# Patient Record
Sex: FEMALE | Race: WHITE | Employment: FULL TIME | URBAN - METROPOLITAN AREA
[De-identification: names, ages, dates, MRNs, and addresses within clinical notes are randomized per-mention and may not be internally consistent; named-entity substitution may affect disease eponyms.]

---

## 2019-09-12 ENCOUNTER — HOSPITAL ENCOUNTER (OUTPATIENT)
Dept: NEUROLOGY | Age: 29
Discharge: HOME | End: 2019-09-12

## 2019-09-12 PROCEDURE — 95861 NEEDLE EMG 2 EXTREMITIES: CPT

## 2022-11-14 ENCOUNTER — HOSPITAL ENCOUNTER (EMERGENCY)
Age: 32
Discharge: HOME | End: 2022-11-14
Attending: EMERGENCY MEDICINE
Payer: COMMERCIAL

## 2022-11-14 ENCOUNTER — APPOINTMENT (OUTPATIENT)
Dept: GENERAL RADIOLOGY | Age: 32
End: 2022-11-14
Payer: COMMERCIAL

## 2022-11-14 VITALS
HEIGHT: 62 IN | BODY MASS INDEX: 40.48 KG/M2 | OXYGEN SATURATION: 100 % | TEMPERATURE: 97.3 F | DIASTOLIC BLOOD PRESSURE: 78 MMHG | RESPIRATION RATE: 16 BRPM | HEART RATE: 72 BPM | SYSTOLIC BLOOD PRESSURE: 142 MMHG | WEIGHT: 220 LBS

## 2022-11-14 DIAGNOSIS — R07.9 CHEST PAIN, UNSPECIFIED TYPE: Primary | ICD-10-CM

## 2022-11-14 LAB
ALBUMIN SERPL-MCNC: 4.4 GM/DL (ref 3.4–5)
ALP BLD-CCNC: 67 IU/L (ref 40–129)
ALT SERPL-CCNC: 19 U/L (ref 10–40)
ANION GAP SERPL CALCULATED.3IONS-SCNC: 9 MMOL/L (ref 4–16)
AST SERPL-CCNC: 20 IU/L (ref 15–37)
BASOPHILS ABSOLUTE: 0.1 K/CU MM
BASOPHILS RELATIVE PERCENT: 1 % (ref 0–1)
BILIRUB SERPL-MCNC: 0.2 MG/DL (ref 0–1)
BUN BLDV-MCNC: 14 MG/DL (ref 6–23)
CALCIUM SERPL-MCNC: 9.9 MG/DL (ref 8.3–10.6)
CHLORIDE BLD-SCNC: 102 MMOL/L (ref 99–110)
CO2: 26 MMOL/L (ref 21–32)
CREAT SERPL-MCNC: 0.7 MG/DL (ref 0.6–1.1)
D DIMER: <0.47 UG/ML (FEU)
DIFFERENTIAL TYPE: ABNORMAL
EKG ATRIAL RATE: 120 BPM
EKG DIAGNOSIS: NORMAL
EKG P AXIS: 63 DEGREES
EKG P-R INTERVAL: 162 MS
EKG Q-T INTERVAL: 322 MS
EKG QRS DURATION: 80 MS
EKG QTC CALCULATION (BAZETT): 455 MS
EKG R AXIS: 45 DEGREES
EKG T AXIS: 8 DEGREES
EKG VENTRICULAR RATE: 120 BPM
EOSINOPHILS ABSOLUTE: 0.1 K/CU MM
EOSINOPHILS RELATIVE PERCENT: 1.2 % (ref 0–3)
GFR SERPL CREATININE-BSD FRML MDRD: >60 ML/MIN/1.73M2
GLUCOSE BLD-MCNC: 119 MG/DL (ref 70–99)
HCG QUALITATIVE: NEGATIVE
HCT VFR BLD CALC: 37.5 % (ref 37–47)
HEMOGLOBIN: 12.9 GM/DL (ref 12.5–16)
IMMATURE NEUTROPHIL %: 0.1 % (ref 0–0.43)
LIPASE: 30 IU/L (ref 13–60)
LYMPHOCYTES ABSOLUTE: 3 K/CU MM
LYMPHOCYTES RELATIVE PERCENT: 36.7 % (ref 24–44)
MCH RBC QN AUTO: 29.4 PG (ref 27–31)
MCHC RBC AUTO-ENTMCNC: 34.4 % (ref 32–36)
MCV RBC AUTO: 85.4 FL (ref 78–100)
MONOCYTES ABSOLUTE: 0.8 K/CU MM
MONOCYTES RELATIVE PERCENT: 9.7 % (ref 0–4)
PDW BLD-RTO: 12.5 % (ref 11.7–14.9)
PLATELET # BLD: 247 K/CU MM (ref 140–440)
PMV BLD AUTO: 10.7 FL (ref 7.5–11.1)
POTASSIUM SERPL-SCNC: 3.4 MMOL/L (ref 3.5–5.1)
RBC # BLD: 4.39 M/CU MM (ref 4.2–5.4)
SEGMENTED NEUTROPHILS ABSOLUTE COUNT: 4.3 K/CU MM
SEGMENTED NEUTROPHILS RELATIVE PERCENT: 51.3 % (ref 36–66)
SODIUM BLD-SCNC: 137 MMOL/L (ref 135–145)
TOTAL IMMATURE NEUTOROPHIL: 0.01 K/CU MM
TOTAL PROTEIN: 7.3 GM/DL (ref 6.4–8.2)
TROPONIN T: <0.01 NG/ML
WBC # BLD: 8.3 K/CU MM (ref 4–10.5)

## 2022-11-14 PROCEDURE — 84484 ASSAY OF TROPONIN QUANT: CPT

## 2022-11-14 PROCEDURE — 93005 ELECTROCARDIOGRAM TRACING: CPT | Performed by: EMERGENCY MEDICINE

## 2022-11-14 PROCEDURE — 93010 ELECTROCARDIOGRAM REPORT: CPT | Performed by: INTERNAL MEDICINE

## 2022-11-14 PROCEDURE — C9113 INJ PANTOPRAZOLE SODIUM, VIA: HCPCS | Performed by: EMERGENCY MEDICINE

## 2022-11-14 PROCEDURE — 84703 CHORIONIC GONADOTROPIN ASSAY: CPT

## 2022-11-14 PROCEDURE — 85025 COMPLETE CBC W/AUTO DIFF WBC: CPT

## 2022-11-14 PROCEDURE — 83690 ASSAY OF LIPASE: CPT

## 2022-11-14 PROCEDURE — 85379 FIBRIN DEGRADATION QUANT: CPT

## 2022-11-14 PROCEDURE — 71046 X-RAY EXAM CHEST 2 VIEWS: CPT

## 2022-11-14 PROCEDURE — 6360000002 HC RX W HCPCS: Performed by: EMERGENCY MEDICINE

## 2022-11-14 PROCEDURE — 6370000000 HC RX 637 (ALT 250 FOR IP): Performed by: EMERGENCY MEDICINE

## 2022-11-14 PROCEDURE — 80053 COMPREHEN METABOLIC PANEL: CPT

## 2022-11-14 PROCEDURE — 99285 EMERGENCY DEPT VISIT HI MDM: CPT

## 2022-11-14 PROCEDURE — 96374 THER/PROPH/DIAG INJ IV PUSH: CPT

## 2022-11-14 RX ORDER — MAGNESIUM HYDROXIDE/ALUMINUM HYDROXICE/SIMETHICONE 120; 1200; 1200 MG/30ML; MG/30ML; MG/30ML
30 SUSPENSION ORAL ONCE
Status: COMPLETED | OUTPATIENT
Start: 2022-11-14 | End: 2022-11-14

## 2022-11-14 RX ORDER — OMEPRAZOLE 20 MG/1
40 CAPSULE, DELAYED RELEASE ORAL DAILY
Qty: 60 CAPSULE | Refills: 0 | Status: SHIPPED | OUTPATIENT
Start: 2022-11-14 | End: 2022-12-14

## 2022-11-14 RX ORDER — PANTOPRAZOLE SODIUM 40 MG/10ML
40 INJECTION, POWDER, LYOPHILIZED, FOR SOLUTION INTRAVENOUS ONCE
Status: COMPLETED | OUTPATIENT
Start: 2022-11-14 | End: 2022-11-14

## 2022-11-14 RX ADMIN — PANTOPRAZOLE SODIUM 40 MG: 40 INJECTION, POWDER, FOR SOLUTION INTRAVENOUS at 01:16

## 2022-11-14 RX ADMIN — ALUMINUM HYDROXIDE, MAGNESIUM HYDROXIDE, AND SIMETHICONE 30 ML: 200; 200; 20 SUSPENSION ORAL at 01:16

## 2022-11-14 ASSESSMENT — PAIN SCALES - GENERAL: PAINLEVEL_OUTOF10: 6

## 2022-11-14 ASSESSMENT — PAIN - FUNCTIONAL ASSESSMENT: PAIN_FUNCTIONAL_ASSESSMENT: 0-10

## 2022-11-14 ASSESSMENT — PAIN DESCRIPTION - LOCATION: LOCATION: CHEST

## 2022-11-14 ASSESSMENT — HEART SCORE: ECG: 1

## 2022-11-14 ASSESSMENT — PAIN DESCRIPTION - DESCRIPTORS: DESCRIPTORS: PRESSURE

## 2022-11-14 NOTE — Clinical Note
Guillermo Matos was seen and treated in our emergency department on 11/14/2022. She may return to work on 11/15/2022. If you have any questions or concerns, please don't hesitate to call.       Iline Nyhan, MD

## 2022-11-14 NOTE — ED PROVIDER NOTES
[address] [facility] ENCOUNTER      Pt Name: Bernabe Rodríguez  MRN: [id number]  Chidi 1990  Date of evaluation: 11/14/2022  Provider: Olivia Weinstein MD    CHIEF COMPLAINT       Chief Complaint   Patient presents with    Chest Pain     Reports got warm, nauseous and          HISTORY OF PRESENT ILLNESS    HPI    Nursing Notes were reviewed. This is a 28 y.o. female who presents to the emergency department with sudden onset of chest pain and palpitations. The patient says that she was sitting on her couch at home when she had a sudden onset of chest pain along with palpitations and a sensation of tachycardia. She says that her apple watch told her that her heart rate was over 140. The patient says that she does have a history of anxiety, however she says that the symptoms are different from her prior episode in regard to the chest pain. She says she has not had chest pain during her episodes of anxiety in the past and she is not sure that this represents a similar episode. Here in the emergency department, the patient still has chest pain but it is somewhat improved from previous. She is no longer tachycardic and no longer has a sensation of palpitations. She describes nausea and a sensation of warmth at the time of the palpitations, however the nausea has also improved. She otherwise denies recent fevers. She denies significant shortness of breath. She denies cough. Denies abdominal pain. She denies leg pains. She denies recent travel. The patient says that she does have some gastric upset due to her medication and frequently takes Tums for the gastric upset however she is not on any regular antacid medication. REVIEW OF SYSTEMS       Review of Systems    10 Systems were reviewed with this patient and all were negative with exception of those noted in the history of present illness above.       PAST MEDICAL HISTORY     Past Medical History:   Diagnosis Date    Post partum depression          SURGICAL HISTORY       Past Surgical History:   Procedure Laterality Date    APPENDECTOMY       SECTION  2010    TUBAL LIGATION           CURRENT MEDICATIONS       Discharge Medication List as of 2022  2:13 AM        CONTINUE these medications which have NOT CHANGED    Details   ibuprofen (CHILDRENS ADVIL) 100 MG/5ML suspension Take 20 mLs by mouth every 4 hours as needed for Fever., Disp-1 Bottle, R-3      dicyclomine (BENTYL) 10 MG capsule Take 1 capsule by mouth 4 times daily as needed for Pain (cramps) for 20 doses. , Disp-20 capsule, R-0      ibuprofen (ADVIL;MOTRIN) 600 MG tablet Take 600 mg by mouth every 6 hours as needed. sertraline (ZOLOFT) 50 MG tablet Take 100 mg by mouth daily. ALLERGIES     Betadine [povidone iodine] and Shellfish-derived products    FAMILY HISTORY       Family History   Problem Relation Age of Onset    Depression Mother           SOCIAL HISTORY       Social History     Socioeconomic History    Marital status: Single   Tobacco Use    Smoking status: Never   Vaping Use    Vaping Use: Every day    Substances: Nicotine   Substance and Sexual Activity    Alcohol use: No    Drug use: No    Sexual activity: Yes     Partners: Male       PHYSICAL EXAM       ED Triage Vitals [22 0045]   BP Temp Temp src Heart Rate Resp SpO2 Height Weight   (!) 150/89 -- -- 76 18 99 % 5' 2\" (1.575 m) 220 lb (99.8 kg)       Physical Exam  Vitals and nursing note reviewed. Constitutional:       General: She is not in acute distress. Appearance: Normal appearance. She is obese. She is not ill-appearing, toxic-appearing or diaphoretic. HENT:      Head: Normocephalic and atraumatic. Nose: Nose normal.      Mouth/Throat:      Mouth: Mucous membranes are moist.   Eyes:      Extraocular Movements: Extraocular movements intact. Pupils: Pupils are equal, round, and reactive to light.    Cardiovascular: Rate and Rhythm: Normal rate and regular rhythm. Pulses: Normal pulses. Heart sounds: Normal heart sounds. Pulmonary:      Effort: Pulmonary effort is normal.      Breath sounds: Normal breath sounds. Abdominal:      Palpations: Abdomen is soft. Tenderness: There is no abdominal tenderness. Musculoskeletal:         General: No tenderness. Normal range of motion. Cervical back: Normal range of motion. Right lower leg: No edema. Left lower leg: No edema. Skin:     General: Skin is warm and dry. Capillary Refill: Capillary refill takes less than 2 seconds. Neurological:      General: No focal deficit present. Mental Status: She is alert and oriented to person, place, and time. Psychiatric:         Mood and Affect: Mood normal.         Behavior: Behavior normal.       Vitals:    Vitals:    11/14/22 0045 11/14/22 0119 11/14/22 0219   BP: (!) 150/89  (!) 142/78   Pulse: 76  72   Resp: 18  16   Temp:  97.3 °F (36.3 °C)    TempSrc:  Infrared    SpO2: 99%  100%   Weight: 220 lb (99.8 kg)     Height: 5' 2\" (1.575 m)         DIAGNOSTIC RESULTS     EKG: All EKG's are interpreted by the Emergency Department Physician who either signs or Co-signs this chart in the absence of a cardiologist.    Sinus tachycardia. Ventricular rate of 120. SC is 162. QRS is 80. QTc is 455. There are no abnormal ST elevations or depressions. There is no ventricular ectopy. There are biphasic T waves in leads III of unclear significance. There is no previous EKG available for comparison at this time. RADIOLOGY:   Non-plain film images such as CT, Ultrasound and MRI are read by the radiologist. Plain radiographic images are visualized and preliminarily interpreted by the emergency physician with the below findings:    Interpretation per the Radiologist below, if available at the time of this note:    XR CHEST (2 VW)   Final Result   No acute process.              LABS:  Labs Reviewed   CBC WITH AUTO DIFFERENTIAL - Abnormal; Notable for the following components:       Result Value    Monocytes % 9.7 (*)     All other components within normal limits   COMPREHENSIVE METABOLIC PANEL - Abnormal; Notable for the following components:    Potassium 3.4 (*)     Glucose 119 (*)     All other components within normal limits   LIPASE   TROPONIN   HCG, SERUM, QUALITATIVE   D-DIMER, RAPID       All other labs were within normal range or not returned as of this dictation. MEDICAL DECISION MAKING     Medications   aluminum & magnesium hydroxide-simethicone (MAALOX) 200-200-20 MG/5ML suspension 30 mL (30 mLs Oral Given 11/14/22 0116)   pantoprazole (PROTONIX) injection 40 mg (40 mg IntraVENous Given 11/14/22 0116)         MDM  Patient has minimal risk factors for acute coronary syndrome and her symptoms appear to be somewhat transient. Patient has no significant risk factors for thromboembolic disease, given low risk, I feel a D-dimer is appropriate to screen for thromboembolic disease. Patient is afebrile and has no significant shortness of breath or cough and pneumonia is unlikely. The patient has a history of anxiety however this is difficult to evaluate has had in the emergency department. The patient also has a history of gastritis and this is also difficult to evaluate in the setting of the emergency department. Nonetheless, the patient has been given antacid medication with mild improvement of her symptoms. A definitive etiology of the patient's symptoms could not be elucidated here in the emergency department. Laboratory evaluation is normal.  Chest x-ray is normal.  EKG shows a mild sinus tachycardia however this is transient and the patient's heart rate is normal both on arrival here in the emergency department and after the EKG was completed.     The possibility of the patient's anxiety exacerbation remains a consideration as well as gastritis, however this cannot be clearly defined in the emergency department. The patient will be discharged home with instructions to follow-up with her primary care physician as well as a gastroenterologist.  She is directed to come back to the emergency room if she has any worsening symptoms including worsening pain, fever, difficulty breathing or any other concerning symptoms. The following Diagnoses were considered in this patient and determined to be unlikely based on the history of present illness, physical exam, as well as other information available. S-T Elevation Myocardial Infarction  Non-S-T Elevation Myocardial Infarction  Pulmonary Embolism  Pneumothorax  Pneumonia  Pericarditis  Esophageal Rupture  Rib Fracture    Primary Diagnosis:Mild acute chest pain    Clinical Diagnoses Addressed  1. Chest pain, unspecified type                [place] Coma Scale  Eye Opening: Spontaneous  Best Verbal Response: Oriented  Best Motor Response: Obeys commands  Laxmi Coma Scale Score: 15     Heart Score for chest pain patients  History: Slightly Suspicious  ECG: Non-Specifc repolarization disturbance/LBTB/PM  Patient Age: < 45 years  *Risk factors for Atherosclerotic disease: Obesity  Risk Factors: 1 or 2 risk factors  Troponin: < 1X normal limit  Heart Score Total: 2               CIWA Assessment  BP: (!) 142/78  Heart Rate: 72                          CONSULTS:  None    PROCEDURES:  Unless otherwise noted below, none     Procedures      FINAL IMPRESSION      1. Chest pain, unspecified type          DISPOSITION/PLAN   DISPOSITION Decision To Discharge 11/14/2022 02:09:28 AM      PATIENT REFERRED TO:  [id number] [facility]    Call in 1 day      MD Deshawn Richardson [address] [facility]  [phone]    Call in 1 day      DISCHARGE MEDICATIONS:  Discharge Medication List as of 11/14/2022  2:13 AM        START taking these medications    Details   omeprazole (PRILOSEC) 20 MG delayed release capsule Take 2 capsules by mouth Daily, Disp-60 capsule, R-0Normal           Controlled Substances Monitoring:     No flowsheet data found.     Nitin Cook MD (electronically signed)  Attending Emergency Physician            Nitin Cook MD  11/14/22 4545

## 2024-11-17 ENCOUNTER — APPOINTMENT (OUTPATIENT)
Dept: GENERAL RADIOLOGY | Age: 34
End: 2024-11-17
Payer: COMMERCIAL

## 2024-11-17 ENCOUNTER — HOSPITAL ENCOUNTER (EMERGENCY)
Age: 34
Discharge: HOME | End: 2024-11-17
Attending: EMERGENCY MEDICINE
Payer: COMMERCIAL

## 2024-11-17 VITALS
BODY MASS INDEX: 36.8 KG/M2 | HEART RATE: 92 BPM | TEMPERATURE: 97.9 F | DIASTOLIC BLOOD PRESSURE: 81 MMHG | SYSTOLIC BLOOD PRESSURE: 148 MMHG | OXYGEN SATURATION: 99 % | HEIGHT: 62 IN | RESPIRATION RATE: 18 BRPM | WEIGHT: 200 LBS

## 2024-11-17 DIAGNOSIS — S93.602A SPRAIN OF LEFT FOOT, INITIAL ENCOUNTER: ICD-10-CM

## 2024-11-17 DIAGNOSIS — S93.402A SPRAIN OF LEFT ANKLE, UNSPECIFIED LIGAMENT, INITIAL ENCOUNTER: Primary | ICD-10-CM

## 2024-11-17 PROCEDURE — 73630 X-RAY EXAM OF FOOT: CPT

## 2024-11-17 PROCEDURE — 99283 EMERGENCY DEPT VISIT LOW MDM: CPT

## 2024-11-17 PROCEDURE — 73610 X-RAY EXAM OF ANKLE: CPT

## 2024-11-17 RX ORDER — ESCITALOPRAM OXALATE 20 MG/1
20 TABLET ORAL DAILY
COMMUNITY

## 2024-11-17 RX ORDER — BUSPIRONE HYDROCHLORIDE 5 MG/1
1 TABLET ORAL
COMMUNITY

## 2024-11-17 RX ORDER — METHOCARBAMOL 500 MG/1
500 TABLET, FILM COATED ORAL NIGHTLY
COMMUNITY
Start: 2024-11-02

## 2024-11-17 ASSESSMENT — PAIN DESCRIPTION - DESCRIPTORS: DESCRIPTORS: ACHING;DULL

## 2024-11-17 ASSESSMENT — PAIN DESCRIPTION - LOCATION: LOCATION: ANKLE

## 2024-11-17 ASSESSMENT — PAIN DESCRIPTION - ORIENTATION: ORIENTATION: LEFT

## 2024-11-17 ASSESSMENT — PAIN SCALES - GENERAL: PAINLEVEL_OUTOF10: 5

## 2024-11-17 ASSESSMENT — PAIN - FUNCTIONAL ASSESSMENT: PAIN_FUNCTIONAL_ASSESSMENT: 0-10

## 2024-11-17 NOTE — DISCHARGE INSTR - COC
Continuity of Care Form    Patient Name: Vilma Paniagua   :  1990  MRN:  [id number]    Admit date:  2024  Discharge date:  ***    Code Status Order: Prior   Advance Directives:   Advance Care Flowsheet Documentation             Admitting Physician:  No admitting provider for patient encounter.  PCP: Megan Andrew (Inactive)    Discharging Nurse: ***  Discharging Hospital Unit/Room#: ED-E0  Discharging Unit Phone Number: ***    Emergency Contact:   Extended Emergency Contact Information  Primary Emergency Contact: SYLVIA ROGERS  Home Phone: [phone]  Mobile Phone: [phone]  Relation: Domestic Partner    Past Surgical History:  Past Surgical History:   Procedure Laterality Date    APPENDECTOMY       SECTION      TUBAL LIGATION         Immunization History:   Immunization History   Administered Date(s) Administered    COVID-19, PFIZER PURPLE top, DILUTE for use, (age 12 y+), 30mcg/0.3mL 10/28/2021, 2021       Active Problems:  There is no problem list on file for this patient.      Isolation/Infection:   Isolation            No Isolation          Patient Infection Status       None to display            Nurse Assessment:  Last Vital Signs: BP (!) 148/81   Pulse 92   Temp 97.9 °F (36.6 °C) (Temporal)   Resp 18   Ht 1.575 m (5' 2\")   Wt 90.7 kg (200 lb)   SpO2 99%   BMI 36.58 kg/m²     Last documented pain score (0-10 scale): Pain Level: 5  Last Weight:   Wt Readings from Last 1 Encounters:   24 90.7 kg (200 lb)     Mental Status:  {IP PT MENTAL STATUS:}    IV Access:  { GARCIA IV ACCESS:046751230}    Nursing Mobility/ADLs:  Walking   {CHP DME ADLs:754469017}  Transfer  {CHP DME ADLs:870240927}  Bathing  {CHP DME ADLs:966637588}  Dressing  {CHP DME ADLs:409842233}  Toileting  {CHP DME ADLs:577589630}  Feeding  {CHP DME ADLs:247714474}  Med Admin  {CHP DME ADLs:525432562}  Med Delivery   { GARCIA MED Delivery:014332046}    Wound Care Documentation and

## 2024-11-17 NOTE — ED PROVIDER NOTES
CHIEF COMPLAINT    Chief Complaint   Patient presents with    Ankle Pain     L foot /ankle pain after \"twisting\" it going down a slide yest     HPI  Vilma Paniagua is a 34 y.o. female who presents to the ED with complaints of left foot and ankle pain.  Patient states that she was going down a slide with circular turns yesterday when her left foot and left ankle struck the corner of a turn causing it to twist in an inversion pattern.  Since then she has been experiencing pain to left ankle and foot predominately to lateral malleolus and fifth metatarsal.  Pain is described as a throbbing and stabbing pain that is moderate in severity and exacerbated by palpation and walking.  Has been ambulatory since the event.  Nothing makes the pain better.  Pain radiates throughout left ankle.  The pain is constant.  No previous injury to left ankle.  Denies numbness, tingling, nausea, vomiting      REVIEW OF SYSTEMS  Constitutional: No fever, chills  Eye: No visual changes  HENT: No earache or sore throat.  Resp: No SOB or productive cough.  Cardio: No chest pain or palpitations.  GI: No abdominal pain, nausea, vomiting, constipation or diarrhea. No melena.  : No dysuria, urgency or frequency.  Endocrine: No heat intolerance, no cold intolerance, no polydipsia   Lymphatics: No adenopathy  Musculoskeletal: Complains of left ankle/foot injury  Neuro: No headaches.  Psych: No homicidal or suicidal thoughts  Skin: No rash, No itching.  ?  ?  PAST MEDICAL HISTORY  Past Medical History:   Diagnosis Date    Post partum depression      FAMILY HISTORY  Family History   Problem Relation Age of Onset    Depression Mother      SOCIAL HISTORY  Social History     Socioeconomic History    Marital status: Single     Spouse name: None    Number of children: None    Years of education: None    Highest education level: None   Tobacco Use    Smoking status: Never   Vaping Use    Vaping status: Every Day    Substances: Nicotine   Substance